# Patient Record
Sex: FEMALE | NOT HISPANIC OR LATINO | URBAN - METROPOLITAN AREA
[De-identification: names, ages, dates, MRNs, and addresses within clinical notes are randomized per-mention and may not be internally consistent; named-entity substitution may affect disease eponyms.]

---

## 2021-07-28 ENCOUNTER — IMPORTED ENCOUNTER (OUTPATIENT)
Dept: URBAN - METROPOLITAN AREA CLINIC 1 | Facility: CLINIC | Age: 26
End: 2021-07-28

## 2021-07-28 PROBLEM — H52.13: Noted: 2021-07-28

## 2021-07-28 PROBLEM — H52.223: Noted: 2021-07-28

## 2021-07-28 PROCEDURE — S0620 ROUTINE OPHTHALMOLOGICAL EXA: HCPCS

## 2021-07-28 NOTE — PATIENT DISCUSSION
1. Myopia w/ Astigmatism OU -- Rx was given for correction if indicated and requested. Return for an appointment in 1 year for a 36 with Dr. Shruthi Alvarado.

## 2022-04-02 ASSESSMENT — VISUAL ACUITY
OD_CC: 20/150
OS_SC: J3
OD_SC: J3
OS_CC: 20/100

## 2022-04-02 ASSESSMENT — TONOMETRY
OD_IOP_MMHG: 10
OS_IOP_MMHG: 10